# Patient Record
Sex: FEMALE | Race: WHITE | NOT HISPANIC OR LATINO | Employment: FULL TIME | URBAN - METROPOLITAN AREA
[De-identification: names, ages, dates, MRNs, and addresses within clinical notes are randomized per-mention and may not be internally consistent; named-entity substitution may affect disease eponyms.]

---

## 2018-10-16 ENCOUNTER — OFFICE VISIT (OUTPATIENT)
Dept: URGENT CARE | Facility: PHYSICIAN GROUP | Age: 23
End: 2018-10-16
Payer: COMMERCIAL

## 2018-10-16 VITALS
TEMPERATURE: 98.7 F | WEIGHT: 245 LBS | RESPIRATION RATE: 20 BRPM | HEIGHT: 68 IN | BODY MASS INDEX: 37.13 KG/M2 | DIASTOLIC BLOOD PRESSURE: 78 MMHG | HEART RATE: 109 BPM | OXYGEN SATURATION: 98 % | SYSTOLIC BLOOD PRESSURE: 128 MMHG

## 2018-10-16 DIAGNOSIS — L08.9 SKIN INFECTION: ICD-10-CM

## 2018-10-16 PROCEDURE — 99214 OFFICE O/P EST MOD 30 MIN: CPT | Performed by: FAMILY MEDICINE

## 2018-10-16 RX ORDER — SULFAMETHOXAZOLE AND TRIMETHOPRIM 800; 160 MG/1; MG/1
1 TABLET ORAL EVERY 12 HOURS
Qty: 14 TAB | Refills: 0 | Status: SHIPPED | OUTPATIENT
Start: 2018-10-16 | End: 2018-10-23

## 2018-10-16 ASSESSMENT — ENCOUNTER SYMPTOMS
ROS SKIN COMMENTS: SEE HPI
DIZZINESS: 0
FEVER: 0
CHILLS: 0

## 2018-10-17 NOTE — PROGRESS NOTES
"Subjective:      Gilda Lester is a 23 y.o. female who presents with Abdominal Pain (Pinching pain/bleeding in navel x1day )    - This is a very pleasant, well and non-toxic appearing 23 y.o. female with complaints of had a little sharp pain in belly button today and then noticed later in day it started oozing some bloody pink fluid. Never had issues before w/ belly button. No NVFC. Feeding well, no stool or urine changes           ALLERGIES:  Patient has no known allergies.     PMH:  No past medical history on file.     MEDS:    Current Outpatient Prescriptions:   •  mupirocin (BACTROBAN) 2 % Ointment, Apply 1 Application to affected area(s) 2 times a day for 7 days., Disp: 30 g, Rfl: 1  •  sulfamethoxazole-trimethoprim (BACTRIM DS) 800-160 MG tablet, Take 1 Tab by mouth every 12 hours for 7 days., Disp: 14 Tab, Rfl: 0    ** I have documented what I find to be significant in regards to past medical, social, family and surgical history  in my HPI or under PMH/PSH/FH review section, otherwise it is contributory **             HPI    Review of Systems   Constitutional: Negative for chills and fever.   Genitourinary: Negative for dysuria and frequency.   Skin:        See hpi    Neurological: Negative for dizziness.   All other systems reviewed and are negative.         Objective:     /78   Pulse (!) 109   Temp 37.1 °C (98.7 °F)   Resp 20   Ht 1.727 m (5' 8\")   Wt 111.1 kg (245 lb)   LMP 10/02/2018   SpO2 98%   Breastfeeding? No   BMI 37.25 kg/m²      Physical Exam   Constitutional: She appears well-developed. No distress.   HENT:   Head: Normocephalic and atraumatic.   Cardiovascular: Regular rhythm.    No murmur heard.  Pulmonary/Chest: Effort normal. No respiratory distress.   Abdominal:       Neurological: She is alert. She exhibits normal muscle tone.   Skin: Skin is warm and dry.   Psychiatric: She has a normal mood and affect. Judgment normal.   Nursing note and vitals reviewed.  umbilicus: 12 " o'clock position looks like a spontaneously ruptured furuncle. = erythema and TTP and some serosanguinous dc from it.                 Assessment/Plan:       1. Skin infection  mupirocin (BACTROBAN) 2 % Ointment    sulfamethoxazole-trimethoprim (BACTRIM DS) 800-160 MG tablet    REFERRAL TO GENERAL SURGERY             Dx & d/c instructions discussed w/ patient and/or family members.     ER precautions (worsening signs symptoms and when to go to ER) discussed.    Follow up w/ PCP in 2-3 days to make sure symptoms improving and no further intervention/treatment and/or work-up needed was advised, ER if feeling worse or not improving in 2 days.    Possible side effects (i.e. Rash, GI upset/constipation, sedation, elevation of BP or sugars) of any medications given discussed.     Patient left in stable condition